# Patient Record
Sex: FEMALE | URBAN - METROPOLITAN AREA
[De-identification: names, ages, dates, MRNs, and addresses within clinical notes are randomized per-mention and may not be internally consistent; named-entity substitution may affect disease eponyms.]

---

## 2019-02-18 ENCOUNTER — APPOINTMENT (RX ONLY)
Dept: URBAN - METROPOLITAN AREA CLINIC 156 | Facility: CLINIC | Age: 74
Setting detail: DERMATOLOGY
End: 2019-02-18

## 2019-02-18 DIAGNOSIS — L21.8 OTHER SEBORRHEIC DERMATITIS: ICD-10-CM

## 2019-02-18 PROBLEM — J30.1 ALLERGIC RHINITIS DUE TO POLLEN: Status: ACTIVE | Noted: 2019-02-18

## 2019-02-18 PROBLEM — J45.909 UNSPECIFIED ASTHMA, UNCOMPLICATED: Status: ACTIVE | Noted: 2019-02-18

## 2019-02-18 PROBLEM — M12.9 ARTHROPATHY, UNSPECIFIED: Status: ACTIVE | Noted: 2019-02-18

## 2019-02-18 PROBLEM — F32.9 MAJOR DEPRESSIVE DISORDER, SINGLE EPISODE, UNSPECIFIED: Status: ACTIVE | Noted: 2019-02-18

## 2019-02-18 PROBLEM — F41.9 ANXIETY DISORDER, UNSPECIFIED: Status: ACTIVE | Noted: 2019-02-18

## 2019-02-18 PROCEDURE — ? PRESCRIPTION

## 2019-02-18 PROCEDURE — 99202 OFFICE O/P NEW SF 15 MIN: CPT

## 2019-02-18 PROCEDURE — ? COUNSELING

## 2019-02-18 RX ORDER — FLUOCINOLONE ACETONIDE 0.1 MG/ML
SOLUTION TOPICAL
Qty: 1 | Refills: 2 | Status: ERX | COMMUNITY
Start: 2019-02-18

## 2019-02-18 RX ORDER — KETOCONAZOLE 20.5 MG/ML
SHAMPOO, SUSPENSION TOPICAL
Qty: 1 | Refills: 2 | Status: ERX | COMMUNITY
Start: 2019-02-18

## 2019-02-18 RX ADMIN — FLUOCINOLONE ACETONIDE: 0.1 SOLUTION TOPICAL at 16:01

## 2019-02-18 RX ADMIN — KETOCONAZOLE: 20.5 SHAMPOO, SUSPENSION TOPICAL at 15:59

## 2019-02-18 ASSESSMENT — LOCATION ZONE DERM: LOCATION ZONE: SCALP

## 2019-02-18 ASSESSMENT — LOCATION DETAILED DESCRIPTION DERM: LOCATION DETAILED: LEFT MEDIAL FRONTAL SCALP

## 2019-02-18 ASSESSMENT — LOCATION SIMPLE DESCRIPTION DERM: LOCATION SIMPLE: LEFT SCALP

## 2019-04-01 ENCOUNTER — APPOINTMENT (RX ONLY)
Dept: URBAN - METROPOLITAN AREA CLINIC 156 | Facility: CLINIC | Age: 74
Setting detail: DERMATOLOGY
End: 2019-04-01

## 2019-04-01 DIAGNOSIS — L82.1 OTHER SEBORRHEIC KERATOSIS: ICD-10-CM

## 2019-04-01 DIAGNOSIS — D18.0 HEMANGIOMA: ICD-10-CM

## 2019-04-01 DIAGNOSIS — L21.8 OTHER SEBORRHEIC DERMATITIS: ICD-10-CM

## 2019-04-01 PROBLEM — D18.01 HEMANGIOMA OF SKIN AND SUBCUTANEOUS TISSUE: Status: ACTIVE | Noted: 2019-04-01

## 2019-04-01 PROCEDURE — ? PRESCRIPTION

## 2019-04-01 PROCEDURE — ? COUNSELING

## 2019-04-01 PROCEDURE — 99212 OFFICE O/P EST SF 10 MIN: CPT

## 2019-04-01 RX ORDER — FLUOCINOLONE ACETONIDE 0.1 MG/ML
SOLUTION TOPICAL
Qty: 1 | Refills: 3 | Status: ERX

## 2019-04-01 RX ORDER — KETOCONAZOLE 20.5 MG/ML
SHAMPOO, SUSPENSION TOPICAL
Qty: 1 | Refills: 3 | Status: ERX

## 2019-04-01 ASSESSMENT — LOCATION SIMPLE DESCRIPTION DERM
LOCATION SIMPLE: SCALP
LOCATION SIMPLE: CHEST
LOCATION SIMPLE: POSTERIOR SCALP
LOCATION SIMPLE: RIGHT POPLITEAL SKIN

## 2019-04-01 ASSESSMENT — LOCATION DETAILED DESCRIPTION DERM
LOCATION DETAILED: MIDDLE STERNUM
LOCATION DETAILED: RIGHT LATERAL POPLITEAL SKIN
LOCATION DETAILED: POSTERIOR MID-PARIETAL SCALP
LOCATION DETAILED: LEFT SUPERIOR PARIETAL SCALP

## 2019-04-01 ASSESSMENT — LOCATION ZONE DERM
LOCATION ZONE: LEG
LOCATION ZONE: SCALP
LOCATION ZONE: TRUNK

## 2019-10-01 ENCOUNTER — APPOINTMENT (RX ONLY)
Dept: URBAN - METROPOLITAN AREA CLINIC 156 | Facility: CLINIC | Age: 74
Setting detail: DERMATOLOGY
End: 2019-10-01

## 2019-10-01 DIAGNOSIS — L21.8 OTHER SEBORRHEIC DERMATITIS: ICD-10-CM | Status: WELL CONTROLLED

## 2019-10-01 PROCEDURE — ? PRESCRIPTION

## 2019-10-01 PROCEDURE — 99213 OFFICE O/P EST LOW 20 MIN: CPT

## 2019-10-01 PROCEDURE — ? COUNSELING

## 2019-10-01 RX ORDER — KETOCONAZOLE 20 MG/ML
SHAMPOO TOPICAL
Qty: 1 | Refills: 3 | Status: ERX | COMMUNITY
Start: 2019-10-01

## 2019-10-01 RX ADMIN — KETOCONAZOLE: 20 SHAMPOO TOPICAL at 15:21

## 2019-10-01 ASSESSMENT — LOCATION SIMPLE DESCRIPTION DERM: LOCATION SIMPLE: POSTERIOR SCALP

## 2019-10-01 ASSESSMENT — LOCATION ZONE DERM: LOCATION ZONE: SCALP

## 2019-10-01 ASSESSMENT — LOCATION DETAILED DESCRIPTION DERM: LOCATION DETAILED: LEFT OCCIPITAL SCALP

## 2020-11-09 ENCOUNTER — APPOINTMENT (RX ONLY)
Dept: URBAN - METROPOLITAN AREA CLINIC 157 | Facility: CLINIC | Age: 75
Setting detail: DERMATOLOGY
End: 2020-11-09

## 2020-11-09 VITALS — TEMPERATURE: 97 F

## 2020-11-09 DIAGNOSIS — L259 CONTACT DERMATITIS AND OTHER ECZEMA, UNSPECIFIED CAUSE: ICD-10-CM

## 2020-11-09 DIAGNOSIS — R60.0 LOCALIZED EDEMA: ICD-10-CM

## 2020-11-09 DIAGNOSIS — L82.1 OTHER SEBORRHEIC KERATOSIS: ICD-10-CM

## 2020-11-09 DIAGNOSIS — L81.4 OTHER MELANIN HYPERPIGMENTATION: ICD-10-CM

## 2020-11-09 DIAGNOSIS — L21.8 OTHER SEBORRHEIC DERMATITIS: ICD-10-CM

## 2020-11-09 DIAGNOSIS — D18.0 HEMANGIOMA: ICD-10-CM

## 2020-11-09 PROBLEM — D18.01 HEMANGIOMA OF SKIN AND SUBCUTANEOUS TISSUE: Status: ACTIVE | Noted: 2020-11-09

## 2020-11-09 PROBLEM — L23.9 ALLERGIC CONTACT DERMATITIS, UNSPECIFIED CAUSE: Status: ACTIVE | Noted: 2020-11-09

## 2020-11-09 PROCEDURE — ? PRESCRIPTION

## 2020-11-09 PROCEDURE — ? ADDITIONAL NOTES

## 2020-11-09 PROCEDURE — ? FULL BODY SKIN EXAM

## 2020-11-09 PROCEDURE — 99214 OFFICE O/P EST MOD 30 MIN: CPT

## 2020-11-09 PROCEDURE — ? COUNSELING

## 2020-11-09 RX ORDER — KETOCONAZOLE 20 MG/ML
SHAMPOO, SUSPENSION TOPICAL QD
Qty: 2 | Refills: 3 | Status: ERX

## 2020-11-09 RX ORDER — TRIAMCINOLONE ACETONIDE 1 MG/G
CREAM TOPICAL
Qty: 1 | Refills: 3 | Status: ERX | COMMUNITY
Start: 2020-11-09

## 2020-11-09 RX ORDER — CLOBETASOL PROPIONATE 0.5 MG/ML
SOLUTION TOPICAL
Qty: 1 | Refills: 4 | Status: ERX | COMMUNITY
Start: 2020-11-09

## 2020-11-09 RX ADMIN — TRIAMCINOLONE ACETONIDE: 1 CREAM TOPICAL at 00:00

## 2020-11-09 RX ADMIN — CLOBETASOL PROPIONATE: 0.5 SOLUTION TOPICAL at 00:00

## 2020-11-09 ASSESSMENT — LOCATION ZONE DERM
LOCATION ZONE: ARM
LOCATION ZONE: TRUNK
LOCATION ZONE: SCALP
LOCATION ZONE: LEG

## 2020-11-09 ASSESSMENT — LOCATION DETAILED DESCRIPTION DERM
LOCATION DETAILED: LEFT POSTERIOR SHOULDER
LOCATION DETAILED: RIGHT CENTRAL FRONTAL SCALP
LOCATION DETAILED: RIGHT INFERIOR UPPER BACK
LOCATION DETAILED: RIGHT MID-UPPER BACK
LOCATION DETAILED: LEFT DISTAL PRETIBIAL REGION
LOCATION DETAILED: RIGHT LATERAL ABDOMEN

## 2020-11-09 ASSESSMENT — LOCATION SIMPLE DESCRIPTION DERM
LOCATION SIMPLE: LEFT SHOULDER
LOCATION SIMPLE: LEFT PRETIBIAL REGION
LOCATION SIMPLE: RIGHT UPPER BACK
LOCATION SIMPLE: RIGHT SCALP
LOCATION SIMPLE: ABDOMEN

## 2020-11-09 NOTE — PROCEDURE: MIPS QUALITY
Quality 111:Pneumonia Vaccination Status For Older Adults: Pneumococcal Vaccination Previously Received
Quality 110: Preventive Care And Screening: Influenza Immunization: Influenza Immunization Administered during Influenza season
Quality 47: Advance Care Plan: Advance care planning not documented, reason not otherwise specified.
Quality 130: Documentation Of Current Medications In The Medical Record: Current Medications Documented
Quality 226: Preventive Care And Screening: Tobacco Use: Screening And Cessation Intervention: Patient screened for tobacco use and is an ex/non-smoker
Detail Level: Detailed
Quality 431: Preventive Care And Screening: Unhealthy Alcohol Use - Screening: Patient screened for unhealthy alcohol use using a single question and scores less than 2 times per year